# Patient Record
Sex: MALE | Race: WHITE
[De-identification: names, ages, dates, MRNs, and addresses within clinical notes are randomized per-mention and may not be internally consistent; named-entity substitution may affect disease eponyms.]

---

## 2019-07-11 ENCOUNTER — HOSPITAL ENCOUNTER (EMERGENCY)
Dept: HOSPITAL 25 - UCCORT | Age: 24
Discharge: HOME | End: 2019-07-11
Payer: COMMERCIAL

## 2019-07-11 VITALS — SYSTOLIC BLOOD PRESSURE: 114 MMHG | DIASTOLIC BLOOD PRESSURE: 83 MMHG

## 2019-07-11 DIAGNOSIS — R10.13: Primary | ICD-10-CM

## 2019-07-11 DIAGNOSIS — R10.11: ICD-10-CM

## 2019-07-11 PROCEDURE — 99201: CPT

## 2019-07-11 PROCEDURE — G0463 HOSPITAL OUTPT CLINIC VISIT: HCPCS

## 2019-07-11 NOTE — UC
Abdominal Pain Male HPI





- HPI Summary


HPI Summary: 





pt presents with c/o mid upper gastric pain that began after " [place] 

[place] Pt states that the pain began the day 

after "partying" and worsened to the point where his "girlfriend made me go to 

the ER". Pt sates he received IV fluids, GI cocktail and "lots of blood work". 

Pt then began taking omeprazole daily and also taking mylanta prior to every 

meal. Pt states that his symptoms have only improved a "little bit" . He states 

he has had this happen before and that beginning OTC GERD medication "usually 

fixes it in a few days" 





- History of Current Complaint


Chief Complaint: UCAbdominalPain


Stated Complaint: ABDOMINAL PAIN


Time Seen by Provider: 07/11/19 18:06


Hx Obtained From: Patient


Onset/Duration: Sudden Onset, Lasting Days, Still Present


Timing: Constant


Severity Initially: Moderate


Severity Currently: Mild


Pain Intensity: 6


Location: Discrete At: RUQ, Epigastric


Radiates: No


Character: Aching, Burning, Colicy, Dull


Aggravating Factor(s): Movement


Alleviating Factor(s): Nothing


Associated Signs And Symptoms: Positive: Vomiting - once last





- Risk Factors


Testicular Torsion: Negative


Cardiac Risk Factors: Negative





- Allergies/Home Medications


Allergies/Adverse Reactions: 


 Allergies











Allergy/AdvReac Type Severity Reaction Status Date / Time


 


No Known Allergies Allergy   Verified 07/11/19 18:01











Home Medications: 


 Home Medications





Al Hydrox/Mg Hydrox/Fredy BULK* [Mylanta - BULK BOT*] 1 aubrey PO AC 07/11/19 [

History Confirmed 07/11/19]


Omeprazole 20 mg PO DAILY 07/11/19 [History Confirmed 07/11/19]











PMH/Surg Hx/FS Hx/Imm Hx


Previously Healthy: Yes


GI/ History: Gastroesophageal Reflux





- Surgical History


Surgery Procedure, Year, and Place: appy, hernia





- Family History


Known Family History: Positive: Cardiac Disease





- Social History


Occupation: Employed Full-time


Lives: With Family


Alcohol Use: Occasionally


Substance Use Type: None


Smoking Status (MU): Never Smoked Tobacco


Have You Smoked in the Last Year: No





Review of Systems


All Other Systems Reviewed And Are Negative: Yes


Constitutional: Positive: Negative


Skin: Positive: Negative


Eyes: Positive: Negative


ENT: Positive: Negative


Respiratory: Positive: Negative


Cardiovascular: Positive: Negative


Gastrointestinal: Positive: Abdominal Pain, Vomiting - X1 on 7/6/19


Genitourinary: Positive: Negative


Motor: Positive: Negative


Neurovascular: Positive: Negative


Musculoskeletal: Positive: Negative


Neurological: Positive: Negative


Psychological: Positive: Negative


Is Patient Immunocompromised?: No





Physical Exam


Triage Information Reviewed: Yes


Appearance: Well-Appearing


Vital Signs: 


 Initial Vital Signs











Temp  98.6 F   07/11/19 17:53


 


Pulse  62   07/11/19 17:53


 


Resp  16   07/11/19 17:53


 


BP  114/83   07/11/19 17:53


 


Pulse Ox  97   07/11/19 17:53











Vital Signs Reviewed: Yes


Eye Exam: Normal


ENT: Positive: Hearing grossly normal


Dental Exam: Normal


Neck exam: Normal


Respiratory Exam: Normal


Cardiovascular Exam: Normal


Abdominal Exam: Other - epigastric tenderness, RUQ tenderness


Musculoskeletal Exam: Normal


Neurological Exam: Normal


Psychological Exam: Normal


Skin Exam: Normal





Abd Pain Male Course/Dx





- Course


Course Of Treatment: 





Pt states he just moved here from [place] and has appointment with new PCP 

on 7/25/19. WE discussed changing to nexium and to resume regular diet. 





- Differential Dx/Clinical Impression


Differential Diagnosis/HQI/PQRI: Gall Bladder Disease, Peptic Ulcer Disease


Provider Diagnosis: 


 Abdominal pain in male








Discharge





- Sign-Out/Discharge


Documenting (check all that apply): Patient Departure


All imaging exams completed and their final reports reviewed: No Studies





- Discharge Plan


Condition: Stable


Disposition: HOME


Patient Education Materials:  Abdominal Pain (ED)


Referrals: 


[facility]ICIAN REFERRAL [Outside] - If Needed


No Primary Care Phys,NOPCP [Primary Care Provider] - 


Additional Instructions: 


As we discussed, please review your concerns with your new PCP.  Please 

consider evaluation for GERD, H. Pylori, gall bladder disease, GI disorders, 

and dietary sensitivities.  





- Billing Disposition and Condition


Condition: STABLE


Disposition: Home